# Patient Record
Sex: MALE | Race: WHITE | NOT HISPANIC OR LATINO | Employment: UNEMPLOYED | URBAN - METROPOLITAN AREA
[De-identification: names, ages, dates, MRNs, and addresses within clinical notes are randomized per-mention and may not be internally consistent; named-entity substitution may affect disease eponyms.]

---

## 2017-10-13 ENCOUNTER — HOSPITAL ENCOUNTER (EMERGENCY)
Facility: MEDICAL CENTER | Age: 37
End: 2017-10-13
Attending: EMERGENCY MEDICINE

## 2017-10-13 ENCOUNTER — APPOINTMENT (OUTPATIENT)
Dept: RADIOLOGY | Facility: MEDICAL CENTER | Age: 37
End: 2017-10-13
Attending: EMERGENCY MEDICINE

## 2017-10-13 VITALS
OXYGEN SATURATION: 97 % | HEART RATE: 89 BPM | SYSTOLIC BLOOD PRESSURE: 211 MMHG | DIASTOLIC BLOOD PRESSURE: 78 MMHG | WEIGHT: 166.23 LBS | TEMPERATURE: 98 F | RESPIRATION RATE: 16 BRPM

## 2017-10-13 DIAGNOSIS — S09.93XA DENTAL INJURY, INITIAL ENCOUNTER: ICD-10-CM

## 2017-10-13 PROCEDURE — A9270 NON-COVERED ITEM OR SERVICE: HCPCS | Performed by: EMERGENCY MEDICINE

## 2017-10-13 PROCEDURE — 70486 CT MAXILLOFACIAL W/O DYE: CPT

## 2017-10-13 PROCEDURE — 99284 EMERGENCY DEPT VISIT MOD MDM: CPT

## 2017-10-13 PROCEDURE — 700102 HCHG RX REV CODE 250 W/ 637 OVERRIDE(OP): Performed by: EMERGENCY MEDICINE

## 2017-10-13 RX ORDER — AMOXICILLIN 500 MG/1
500 CAPSULE ORAL 3 TIMES DAILY
Qty: 30 CAP | Refills: 0 | Status: SHIPPED | OUTPATIENT
Start: 2017-10-13

## 2017-10-13 RX ORDER — HYDROCODONE BITARTRATE AND ACETAMINOPHEN 5; 325 MG/1; MG/1
1-2 TABLET ORAL EVERY 4 HOURS PRN
Qty: 10 TAB | Refills: 0 | Status: SHIPPED | OUTPATIENT
Start: 2017-10-13

## 2017-10-13 RX ORDER — HYDROCODONE BITARTRATE AND ACETAMINOPHEN 5; 325 MG/1; MG/1
1 TABLET ORAL ONCE
Status: COMPLETED | OUTPATIENT
Start: 2017-10-13 | End: 2017-10-13

## 2017-10-13 RX ADMIN — HYDROCODONE BITARTRATE AND ACETAMINOPHEN 1 TABLET: 5; 325 TABLET ORAL at 14:37

## 2017-10-13 ASSESSMENT — PAIN SCALES - GENERAL
PAINLEVEL_OUTOF10: 6
PAINLEVEL_OUTOF10: 4

## 2017-10-13 NOTE — ED NOTES
Assist RN note: Pt states understanding of dc instructions and f/u care. Pt able to amb out w/ steady gait.
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
## 2017-10-13 NOTE — DISCHARGE INSTRUCTIONS
Mouth Injury  Cuts and scrapes inside the mouth are common from falls or bites. They tend to bleed a lot. Most mouth injuries heal quickly.   HOME CARE  · See your dentist right away if teeth are broken. Take all broken pieces with you to the dentist.  · Press on the bleeding site with a germ free (sterile) gauze or piece of clean cloth. This will help stop the bleeding.  · Cold drinks or ice will help keep the puffiness (swelling) down.  · Gargle with warm salt water after 1 day. Put 1 teaspoon of salt into 1 cup of warm water.  · Only take medicine as told by your doctor.  · Eat soft foods until healing is complete.  · Avoid any salty or citrus foods. They may sting your mouth.  · Rinse your mouth with warm water after meals.  GET HELP RIGHT AWAY IF:   · You have a large amount of bleeding that will not stop.  · You have severe pain.  · You have trouble swallowing.  · Your mouth becomes infected.  · You have a fever.  MAKE SURE YOU:   · Understand these instructions.  · Will watch your condition.  · Will get help right away if you are not doing well or get worse.  Document Released: 03/14/2011 Document Revised: 03/11/2013 Document Reviewed: 03/14/2011  [employer] Patient Information ©2014 [employer].